# Patient Record
Sex: FEMALE | Race: BLACK OR AFRICAN AMERICAN | NOT HISPANIC OR LATINO | Employment: UNEMPLOYED | URBAN - METROPOLITAN AREA
[De-identification: names, ages, dates, MRNs, and addresses within clinical notes are randomized per-mention and may not be internally consistent; named-entity substitution may affect disease eponyms.]

---

## 2022-04-20 ENCOUNTER — SOCIAL WORK (OUTPATIENT)
Dept: ADMINISTRATIVE | Facility: OTHER | Age: 22
End: 2022-04-20

## 2022-04-20 NOTE — PROGRESS NOTES
SW met with pt regarding initial OB assessment. Pt stated this is her 1st pregnancy/0-miscarriage. Pt stated lives with her sister and able to perform ADL's independently. Pt stated does not work. Pt stated support system is her sister/Guille. Pt stated has medicaid([employer]). Pt stated does not have WIC. Pt stated is going to breastfeed. SW provide pt with information on other community resources and referred to the [facility].SW faxed and scanned pt's notification of pregnancy into epic.  No other needs identified at this time.    Azucena Castillo,MSW  Pager#[phone]

## 2022-07-18 PROBLEM — J45.909 ASTHMA: Status: ACTIVE | Noted: 2022-07-18

## 2022-07-18 PROBLEM — F32.A DEPRESSION: Status: ACTIVE | Noted: 2022-07-18

## 2022-07-18 PROBLEM — Z34.92 ENCOUNTER FOR SUPERVISION OF NORMAL PREGNANCY IN SECOND TRIMESTER: Status: ACTIVE | Noted: 2022-07-18

## 2022-07-29 PROBLEM — E66.01 CLASS 3 SEVERE OBESITY WITH BODY MASS INDEX (BMI) OF 40.0 TO 44.9 IN ADULT: Status: ACTIVE | Noted: 2022-07-29

## 2022-08-29 PROBLEM — F31.9 BIPOLAR 1 DISORDER: Status: ACTIVE | Noted: 2022-08-29

## 2022-10-24 PROBLEM — R10.9 ABDOMINAL CRAMPS: Status: ACTIVE | Noted: 2022-10-24

## 2022-11-01 PROBLEM — O26.893 PREGNANCY HEADACHE IN THIRD TRIMESTER: Status: ACTIVE | Noted: 2022-11-01

## 2022-11-01 PROBLEM — R51.9 PREGNANCY HEADACHE IN THIRD TRIMESTER: Status: ACTIVE | Noted: 2022-11-01

## 2022-11-13 PROBLEM — O99.820 GROUP B STREPTOCOCCAL CARRIAGE COMPLICATING PREGNANCY: Status: ACTIVE | Noted: 2022-11-13

## 2022-11-13 PROBLEM — O99.013 ANEMIA DURING PREGNANCY IN THIRD TRIMESTER: Status: ACTIVE | Noted: 2022-11-13

## 2022-11-15 PROBLEM — O99.820 GROUP B STREPTOCOCCAL CARRIAGE COMPLICATING PREGNANCY: Status: RESOLVED | Noted: 2022-11-13 | Resolved: 2022-11-15
